# Patient Record
Sex: FEMALE | Race: WHITE | Employment: FULL TIME | URBAN - METROPOLITAN AREA
[De-identification: names, ages, dates, MRNs, and addresses within clinical notes are randomized per-mention and may not be internally consistent; named-entity substitution may affect disease eponyms.]

---

## 2019-04-30 LAB — PAP SMEAR, EXTERNAL: NEGATIVE

## 2020-08-07 VITALS
DIASTOLIC BLOOD PRESSURE: 79 MMHG | WEIGHT: 179.4 LBS | BODY MASS INDEX: 31.79 KG/M2 | HEIGHT: 63 IN | SYSTOLIC BLOOD PRESSURE: 119 MMHG

## 2020-08-12 ENCOUNTER — OFFICE VISIT (OUTPATIENT)
Dept: OBGYN CLINIC | Age: 40
End: 2020-08-12
Payer: COMMERCIAL

## 2020-08-12 VITALS
WEIGHT: 185.5 LBS | BODY MASS INDEX: 32.87 KG/M2 | DIASTOLIC BLOOD PRESSURE: 74 MMHG | SYSTOLIC BLOOD PRESSURE: 114 MMHG | HEIGHT: 63 IN

## 2020-08-12 DIAGNOSIS — N94.89 SUPPRESSION OF MENSTRUATION: ICD-10-CM

## 2020-08-12 DIAGNOSIS — Z12.4 SCREENING FOR MALIGNANT NEOPLASM OF CERVIX: Primary | ICD-10-CM

## 2020-08-12 DIAGNOSIS — Z01.419 GYNECOLOGIC EXAM NORMAL: ICD-10-CM

## 2020-08-12 PROCEDURE — 99396 PREV VISIT EST AGE 40-64: CPT | Performed by: OBSTETRICS & GYNECOLOGY

## 2020-08-12 RX ORDER — DEXTROAMPHETAMINE SACCHARATE, AMPHETAMINE ASPARTATE MONOHYDRATE, DEXTROAMPHETAMINE SULFATE AND AMPHETAMINE SULFATE 5; 5; 5; 5 MG/1; MG/1; MG/1; MG/1
CAPSULE, EXTENDED RELEASE ORAL
COMMUNITY
Start: 2020-08-04

## 2020-08-12 RX ORDER — NORGESTIMATE AND ETHINYL ESTRADIOL 0.25-0.035
1 KIT ORAL DAILY
Qty: 3 PACKAGE | Refills: 3 | Status: SHIPPED | OUTPATIENT
Start: 2020-08-12 | End: 2021-08-04 | Stop reason: ALTCHOICE

## 2020-08-12 RX ORDER — NORGESTIMATE AND ETHINYL ESTRADIOL 0.25-0.035
KIT ORAL
COMMUNITY
Start: 2020-08-01 | End: 2020-08-12 | Stop reason: SDUPTHER

## 2020-08-12 NOTE — PROGRESS NOTES
Emory Nunn is a , 38 y.o. female   Patient's last menstrual period was 2020 (exact date). She presents for her annual    She is having no significant problems. Menstrual status:  Her periods are: normal. Cycles are: usually regular with a 26-32 day interval with 3-7 day duration. She does not have dysmenorrhea. Medical conditions:  Since her last annual GYN exam about one year ago, she has not the following changes in her health history: none. History reviewed. No pertinent past medical history. Past Surgical History:   Procedure Laterality Date    HX ORTHOPAEDIC      Right Knee Surgery       Prior to Admission medications    Medication Sig Start Date End Date Taking? Authorizing Provider   amphetamine-dextroamphetamine XR (ADDERALL XR) 20 mg XR capsule TAKE 1 CAPSULE BY MOUTH IN THE MORNING 20  Yes Provider, Historical   Sprintec, 28, 0.25-35 mg-mcg tab Take 1 Tab by mouth daily. 20  Yes Laure Yeung MD       No Known Allergies       Tobacco History:  reports that she has never smoked. She has never used smokeless tobacco.  Alcohol Abuse:  reports current alcohol use. Drug Abuse:  has no history on file for drug. Family Medical/Cancer History:   Family History   Family history unknown: Yes          Review of Systems   Constitutional: Negative for chills, fever, malaise/fatigue and weight loss. HENT: Negative for congestion, ear pain, sinus pain and tinnitus. Eyes: Negative for blurred vision and double vision. Respiratory: Negative for cough, shortness of breath and wheezing. Cardiovascular: Negative for chest pain and palpitations. Gastrointestinal: Negative for abdominal pain, blood in stool, constipation, diarrhea, heartburn, nausea and vomiting. Genitourinary: Negative for dysuria, flank pain, frequency, hematuria and urgency. Musculoskeletal: Negative for joint pain and myalgias. Skin: Negative for itching and rash. Neurological: Negative for dizziness, weakness and headaches. Psychiatric/Behavioral: Negative for depression, memory loss and suicidal ideas. The patient is not nervous/anxious and does not have insomnia. Physical Exam  Constitutional:       Appearance: Normal appearance. HENT:      Head: Normocephalic and atraumatic. Cardiovascular:      Rate and Rhythm: Normal rate. Heart sounds: Normal heart sounds. Pulmonary:      Effort: Pulmonary effort is normal.      Breath sounds: Normal breath sounds. Chest:      Breasts:         Right: Normal.         Left: Normal.   Abdominal:      General: Abdomen is flat. Bowel sounds are normal.      Palpations: Abdomen is soft. Genitourinary:     General: Normal vulva. Vagina: Normal.      Cervix: Normal.      Uterus: Normal.       Adnexa: Right adnexa normal and left adnexa normal.      Rectum: Normal.      Comments: PAP Obtained  Neurological:      Mental Status: She is alert. Psychiatric:         Mood and Affect: Mood normal.         Behavior: Behavior normal.         Thought Content: Thought content normal.          Visit Vitals  /74 (BP 1 Location: Left arm, BP Patient Position: Sitting)   Ht 5' 3\" (1.6 m)   Wt 185 lb 8 oz (84.1 kg)   LMP 08/05/2020 (Exact Date)   BMI 32.86 kg/m²         Assessment:  Diagnoses and all orders for this visit:    1. Screening for malignant neoplasm of cervix  -     PAP, LB, RFX HPV HVRYF([id number])    2. Gynecologic exam normal    3. Suppression of menstruation    Other orders  -     Sprintec, 28, 0.25-35 mg-mcg tab; Take 1 Tab by mouth daily. Plan:Questions addressed  Counseled re: diet, exercise, healthy lifestyle  Return for Annual  Rec annual mammogram        Follow-up and Dispositions    · Return for Mammogram, 1 yr annual, 1 yr mammo.

## 2020-08-14 LAB
CYTOLOGIST CVX/VAG CYTO: NORMAL
CYTOLOGY CVX/VAG DOC CYTO: NORMAL
DX ICD CODE: NORMAL
LABCORP, 190119: NORMAL
Lab: NORMAL
OTHER STN SPEC: NORMAL
STAT OF ADQ CVX/VAG CYTO-IMP: NORMAL

## 2021-05-10 ENCOUNTER — TELEPHONE (OUTPATIENT)
Dept: OBGYN CLINIC | Age: 41
End: 2021-05-10

## 2021-05-10 RX ORDER — NORGESTIMATE AND ETHINYL ESTRADIOL 7DAYSX3 28
1 KIT ORAL DAILY
Qty: 3 PACKAGE | Refills: 0 | Status: SHIPPED | OUTPATIENT
Start: 2021-05-10 | End: 2021-08-04 | Stop reason: ALTCHOICE

## 2021-05-10 NOTE — PROGRESS NOTES
RTC as requested, pt having BTB in the 3rd week of current oc, has been occurring for approx the last year- DWP, will change OC to see if that corrects things

## 2021-05-28 ENCOUNTER — TELEPHONE (OUTPATIENT)
Dept: OBGYN CLINIC | Age: 41
End: 2021-05-28

## 2021-05-28 NOTE — TELEPHONE ENCOUNTER
Returned the patient's call and she states she has not received a call from the scheduling department regarding her need for additional imaging. Patient scheduled at [facility] on 06/04/21 at 9:30 with a 9:00 arrival time. Orders faxed.

## 2021-08-04 ENCOUNTER — TELEPHONE (OUTPATIENT)
Dept: OBGYN CLINIC | Age: 41
End: 2021-08-04

## 2021-08-04 DIAGNOSIS — N94.89 SUPPRESSION OF MENSES: Primary | ICD-10-CM

## 2021-08-04 RX ORDER — NORGESTIMATE AND ETHINYL ESTRADIOL 7DAYSX3 28
1 KIT ORAL DAILY
Qty: 3 PACKAGE | Refills: 0 | Status: SHIPPED | OUTPATIENT
Start: 2021-08-04 | End: 2021-09-22 | Stop reason: SDUPTHER

## 2021-08-04 NOTE — TELEPHONE ENCOUNTER
Returned the patient's call and she is requesting a refill on medication. She is scheduled for her annual exam on 08/31/21. Refill sent to the patient's pharmacy.

## 2021-09-22 ENCOUNTER — OFFICE VISIT (OUTPATIENT)
Dept: OBGYN CLINIC | Age: 41
End: 2021-09-22
Payer: COMMERCIAL

## 2021-09-22 VITALS
BODY MASS INDEX: 32.6 KG/M2 | SYSTOLIC BLOOD PRESSURE: 110 MMHG | DIASTOLIC BLOOD PRESSURE: 70 MMHG | HEIGHT: 63 IN | WEIGHT: 184 LBS

## 2021-09-22 DIAGNOSIS — F50.89 PICA: ICD-10-CM

## 2021-09-22 DIAGNOSIS — N94.89 SUPPRESSION OF MENSES: ICD-10-CM

## 2021-09-22 DIAGNOSIS — Z12.4 SCREENING FOR MALIGNANT NEOPLASM OF CERVIX: Primary | ICD-10-CM

## 2021-09-22 DIAGNOSIS — N94.89 SUPPRESSION OF MENSTRUATION: ICD-10-CM

## 2021-09-22 DIAGNOSIS — D50.9 IRON DEFICIENCY ANEMIA, UNSPECIFIED IRON DEFICIENCY ANEMIA TYPE: ICD-10-CM

## 2021-09-22 DIAGNOSIS — Z01.419 ROUTINE GYNECOLOGICAL EXAMINATION: ICD-10-CM

## 2021-09-22 PROCEDURE — 99396 PREV VISIT EST AGE 40-64: CPT | Performed by: OBSTETRICS & GYNECOLOGY

## 2021-09-22 RX ORDER — LEVONORGESTREL AND ETHINYL ESTRADIOL 0.15-0.03
1 KIT ORAL DAILY
Qty: 1 DOSE PACK | Refills: 3 | Status: SHIPPED | OUTPATIENT
Start: 2021-09-22 | End: 2022-04-22

## 2021-09-22 RX ORDER — NORGESTIMATE AND ETHINYL ESTRADIOL 7DAYSX3 28
1 KIT ORAL DAILY
Qty: 3 DOSE PACK | Refills: 3 | Status: SHIPPED | OUTPATIENT
Start: 2021-09-22

## 2021-09-22 NOTE — PROGRESS NOTES
Chief Complaint   Patient presents with    Annual Exam     [id number]     Visit Vitals  /70 (BP 1 Location: Left upper arm, BP Patient Position: Sitting, BP Cuff Size: Large adult)   Ht 5' 3\" (1.6 m)   Wt 184 lb (83.5 kg)   LMP 09/01/2021 (Approximate)   BMI 32.59 kg/m²

## 2021-09-22 NOTE — PROGRESS NOTES
Chito Rascon is a , 42 y.o. female   Patient's last menstrual period was 2021 (approximate). She presents for her annual    She is having no significant problems. PICA sxs and anemia- having irregular bleeding on OC's- now occurs in the 3rd week      Menstrual status:  Cycles are very light to nonexistent due to contraceptive hormones. Flow: moderate      She does not have dysmenorrhea. Medical conditions:  Since her last annual GYN exam about one year ago, she has not the following changes in her health history: none. Mammogram History:    [facility] Results (most recent):  Results from Appointment encounter on 05/10/21    [facility] MAMMO BI SCREENING INCL CAD    Narrative  History: Baseline, asymptomatic. The breasts are heterogeneously dense which could obscure mammographic findings. In the CC view of the left breast, there are 2 focal asymmetries, one located  slightly lateral to the nipple line, the other medially, each in the mid to  posterior coronal third of the breast. No other suspicious finding or pathologic  calcification is seen in either breast.    CAD was used in interpretation. Impression  Spot compression views and possibly ultrasound are needed to  evaluate focal asymmetries in left breast.    BI-RADS 0 need additional imaging evaluation. Follow-up: Additional views and possibly ultrasound. DEXA Results (most recent):  No results found for this or any previous visit. Past Medical History:   Diagnosis Date    Anemia     Heavy periods      Past Surgical History:   Procedure Laterality Date    HX ORTHOPAEDIC      Right Knee Surgery       Prior to Admission medications    Medication Sig Start Date End Date Taking? Authorizing Provider   norgestimate-ethinyl estradioL (Tri-Sprintec, 28,) 0.18/0.215/0.25 mg-35 mcg (28) tab Take 1 Tablet by mouth daily.  21  Yes Juan Kc MD   amphetamine-dextroamphetamine XR (ADDERALL XR) 20 mg XR capsule TAKE 1 CAPSULE BY MOUTH IN THE MORNING 8/4/20  Yes Provider, Historical       No Known Allergies       Tobacco History:  reports that she has never smoked. She has never used smokeless tobacco.  Alcohol Abuse:  reports current alcohol use. Drug Abuse:  reports previous drug use. Family Medical/Cancer History:   Family History   Family history unknown: Yes          Review of Systems   Constitutional: Negative for chills, fever, malaise/fatigue and weight loss. HENT: Negative for congestion, ear pain, sinus pain and tinnitus. Eyes: Negative for blurred vision and double vision. Respiratory: Negative for cough, shortness of breath and wheezing. Cardiovascular: Negative for chest pain and palpitations. Gastrointestinal: Negative for abdominal pain, blood in stool, constipation, diarrhea, heartburn, nausea and vomiting. Genitourinary: Negative for dysuria, flank pain, frequency, hematuria and urgency. Musculoskeletal: Negative for joint pain and myalgias. Skin: Negative for itching and rash. Neurological: Negative for dizziness, weakness and headaches. Psychiatric/Behavioral: Negative for depression, memory loss and suicidal ideas. The patient is not nervous/anxious and does not have insomnia. Physical Exam  Constitutional:       Appearance: Normal appearance. HENT:      Head: Normocephalic and atraumatic. Cardiovascular:      Rate and Rhythm: Normal rate. Heart sounds: Normal heart sounds. Pulmonary:      Effort: Pulmonary effort is normal.      Breath sounds: Normal breath sounds. Chest:      Breasts:         Right: Normal.         Left: Normal.   Abdominal:      General: Abdomen is flat. Palpations: Abdomen is soft. Genitourinary:     General: Normal vulva.       Vagina: Normal.      Cervix: Normal.      Uterus: Normal.       Adnexa: Right adnexa normal and left adnexa normal.      Rectum: Normal.      Comments: PAP Obtained  Neurological:      Mental Status: She is alert.   Psychiatric:         Mood and Affect: Mood normal.         Behavior: Behavior normal.         Thought Content: Thought content normal.          Visit Vitals  /70 (BP 1 Location: Left upper arm, BP Patient Position: Sitting, BP Cuff Size: Large adult)   Ht 5' 3\" (1.6 m)   Wt 184 lb (83.5 kg)   LMP 09/01/2021 (Approximate)   BMI 32.59 kg/m²         Assessment: Diagnoses and all orders for this visit:    1. Screening for malignant neoplasm of cervix  -     PAP IG, RFX APTIMA HPV ASCUS ([id number])    2. Routine gynecological examination  -     PAP IG, RFX APTIMA HPV ASCUS ([id number])    3. Suppression of menstruation    4. BMI 32.0-32.9,adult    5. Suppression of menses  -     norgestimate-ethinyl estradioL (Tri-Sprintec, 28,) 0.18/0.215/0.25 mg-35 mcg (28) tab; Take 1 Tablet by mouth daily. 6. Iron deficiency anemia, unspecified iron deficiency anemia type    7.  Pica        Plan: Questions addressed  Counseled re: diet, exercise, healthy lifestyle  Return for Annual  Rec annual mammogram

## 2021-09-24 LAB
CYTOLOGIST CVX/VAG CYTO: NORMAL
CYTOLOGY CVX/VAG DOC CYTO: NORMAL
CYTOLOGY CVX/VAG DOC THIN PREP: NORMAL
DX ICD CODE: NORMAL
LABCORP, 190119: NORMAL
Lab: NORMAL
OTHER STN SPEC: NORMAL
STAT OF ADQ CVX/VAG CYTO-IMP: NORMAL

## 2022-01-19 ENCOUNTER — TELEPHONE (OUTPATIENT)
Dept: OBGYN CLINIC | Age: 42
End: 2022-01-19

## 2022-01-19 NOTE — TELEPHONE ENCOUNTER
Returned the patient's call regarding her c/o spotting daily for the last 3 months since being on OCPs. States she is now experiencing heavy vaginal bleeding. She is wearing a super plus tampon and having to change it every hour. Patient didn't answer and her mailbox is full. She would like to speak with Dr Jigar Francisco. Message forwarded.

## 2022-04-04 ENCOUNTER — TELEPHONE (OUTPATIENT)
Dept: OBGYN CLINIC | Age: 42
End: 2022-04-04

## 2022-04-04 NOTE — TELEPHONE ENCOUNTER
Patient called stating she is still experiencing DUB and would like to discuss having an ablation. States she is taking the extended cycle pills and did ok with the first pack and 2 weeks of the next pack but had been bleeding since then. Advised her Dr Kelly Huang is not in the office today but her message would be forwarded to him.

## 2022-04-22 ENCOUNTER — OFFICE VISIT (OUTPATIENT)
Dept: OBGYN CLINIC | Age: 42
End: 2022-04-22
Payer: COMMERCIAL

## 2022-04-22 VITALS — WEIGHT: 191 LBS | BODY MASS INDEX: 33.84 KG/M2 | HEIGHT: 63 IN

## 2022-04-22 DIAGNOSIS — N92.0 MENORRHAGIA WITH REGULAR CYCLE: Primary | ICD-10-CM

## 2022-04-22 DIAGNOSIS — D50.0 IRON DEFICIENCY ANEMIA DUE TO CHRONIC BLOOD LOSS: ICD-10-CM

## 2022-04-22 PROCEDURE — 99213 OFFICE O/P EST LOW 20 MIN: CPT | Performed by: OBSTETRICS & GYNECOLOGY

## 2022-04-22 NOTE — PROGRESS NOTES
Chai Vazquez is a , 42 y.o. female   No LMP recorded. She presents for her problem    She is having menorrhagia to anemia- failed medcial therapy with OC. Menstrual status:  Cycles are usually regular with a 26-32 day interval with 3-7 day duration. Flow: heavy.with clots      She does not have dysmenorrhea. Medical conditions:  Since her last annual GYN exam about one year ago, she has not the following changes in her health history: none. Mammogram History:    [facility] Results (most recent):  Results from Appointment encounter on 05/10/21    [facility] MAMMO BI SCREENING INCL CAD    Narrative  History: Baseline, asymptomatic. The breasts are heterogeneously dense which could obscure mammographic findings. In the CC view of the left breast, there are 2 focal asymmetries, one located  slightly lateral to the nipple line, the other medially, each in the mid to  posterior coronal third of the breast. No other suspicious finding or pathologic  calcification is seen in either breast.    CAD was used in interpretation. Impression  Spot compression views and possibly ultrasound are needed to  evaluate focal asymmetries in left breast.    BI-RADS 0 need additional imaging evaluation. Follow-up: Additional views and possibly ultrasound. DEXA Results (most recent):  No results found for this or any previous visit. Past Medical History:   Diagnosis Date    Anemia     Heavy periods      Past Surgical History:   Procedure Laterality Date    HX ORTHOPAEDIC      Right Knee Surgery       Prior to Admission medications    Medication Sig Start Date End Date Taking? Authorizing Provider   norgestimate-ethinyl estradioL (Tri-Sprintec, 28,) 0.18/0.215/0.25 mg-35 mcg (28) tab Take 1 Tablet by mouth daily.  21  Yes Annalisa Bradford MD   amphetamine-dextroamphetamine XR (ADDERALL XR) 20 mg XR capsule TAKE 1 CAPSULE BY MOUTH IN THE MORNING 20  Yes Provider, Historical       No Known Allergies       Tobacco History:  reports that she has never smoked. She has never used smokeless tobacco.  Alcohol use:  reports current alcohol use. Drug use:  reports previous drug use. Family Medical/Cancer History:   Family History   Family history unknown: Yes          Review of Systems   Constitutional: Negative for chills, fever, malaise/fatigue and weight loss. HENT: Negative for congestion, ear pain, sinus pain and tinnitus. Eyes: Negative for blurred vision and double vision. Respiratory: Negative for cough, shortness of breath and wheezing. Cardiovascular: Negative for chest pain and palpitations. Gastrointestinal: Negative for abdominal pain, blood in stool, constipation, diarrhea, heartburn, nausea and vomiting. Genitourinary: Negative for dysuria, flank pain, frequency, hematuria and urgency. Musculoskeletal: Negative for joint pain and myalgias. Skin: Negative for itching and rash. Neurological: Negative for dizziness, weakness and headaches. Psychiatric/Behavioral: Negative for depression, memory loss and suicidal ideas. The patient is not nervous/anxious and does not have insomnia. Physical Exam  Constitutional:       Appearance: Normal appearance. HENT:      Head: Normocephalic and atraumatic. Abdominal:      General: Abdomen is flat. Palpations: Abdomen is soft. Genitourinary:     General: Normal vulva. Vagina: Normal.      Cervix: Normal.      Uterus: Normal. Enlarged. Adnexa: Right adnexa normal and left adnexa normal.      Rectum: Normal.      Comments: No PAP obtained  Neurological:      Mental Status: She is alert. Psychiatric:         Mood and Affect: Mood normal.         Behavior: Behavior normal.         Thought Content: Thought content normal.          Visit Vitals  Ht 5' 3\" (1.6 m)   Wt 191 lb (86.6 kg)   BMI 33.83 kg/m²         Assessment: Diagnoses and all orders for this visit:    1. Menorrhagia with regular cycle    2.  Iron deficiency anemia due to chronic blood loss    Risks benefits and alternatives DWP- Hysteroscopy/D and C/ Novasure    Plan: Questions addressed  Counseled re: diet, exercise, healthy lifestyle  Return for Annual  Rec annual mammogram

## 2022-04-22 NOTE — PROGRESS NOTES
Chief Complaint   Patient presents with    Pre-op Exam     Visit Vitals  Ht 5' 3\" (1.6 m)   Wt 191 lb (86.6 kg)   BMI 33.83 kg/m²

## 2022-05-05 ENCOUNTER — HOSPITAL ENCOUNTER (OUTPATIENT)
Dept: PREADMISSION TESTING | Age: 42
Discharge: HOME | End: 2022-05-05
Payer: COMMERCIAL

## 2022-05-05 VITALS
HEART RATE: 89 BPM | OXYGEN SATURATION: 100 % | DIASTOLIC BLOOD PRESSURE: 79 MMHG | SYSTOLIC BLOOD PRESSURE: 134 MMHG | BODY MASS INDEX: 34.09 KG/M2 | TEMPERATURE: 98.3 F | RESPIRATION RATE: 20 BRPM | WEIGHT: 192.4 LBS | HEIGHT: 63 IN

## 2022-05-05 LAB
ABO + RH BLD: NORMAL
ANION GAP SERPL CALC-SCNC: 6 MMOL/L (ref 5–15)
BLOOD GROUP ANTIBODIES SERPL: NEGATIVE
BUN SERPL-MCNC: 9 MG/DL (ref 6–20)
BUN/CREAT SERPL: 10 (ref 12–20)
CA-I BLD-MCNC: 9.2 MG/DL (ref 8.5–10.1)
CHLORIDE SERPL-SCNC: 107 MMOL/L (ref 97–108)
CO2 SERPL-SCNC: 25 MMOL/L (ref 21–32)
CREAT SERPL-MCNC: 0.92 MG/DL (ref 0.55–1.02)
ERYTHROCYTE [DISTWIDTH] IN BLOOD BY AUTOMATED COUNT: 17.1 % (ref 11.5–14.5)
GLUCOSE SERPL-MCNC: 91 MG/DL (ref 65–100)
HCT VFR BLD AUTO: 35.4 % (ref 35–47)
HGB BLD-MCNC: 10.5 G/DL (ref 11.5–16)
MCH RBC QN AUTO: 21 PG (ref 26–34)
MCHC RBC AUTO-ENTMCNC: 29.7 G/DL (ref 30–36.5)
MCV RBC AUTO: 70.9 FL (ref 80–99)
NRBC # BLD: 0 K/UL (ref 0–0.01)
NRBC BLD-RTO: 0 PER 100 WBC
PLATELET # BLD AUTO: 351 K/UL (ref 150–400)
PMV BLD AUTO: 10.5 FL (ref 8.9–12.9)
POTASSIUM SERPL-SCNC: 3.7 MMOL/L (ref 3.5–5.1)
RBC # BLD AUTO: 4.99 M/UL (ref 3.8–5.2)
SODIUM SERPL-SCNC: 138 MMOL/L (ref 136–145)
SPECIMEN EXP DATE BLD: NORMAL
WBC # BLD AUTO: 7.7 K/UL (ref 3.6–11)

## 2022-05-05 PROCEDURE — 36415 COLL VENOUS BLD VENIPUNCTURE: CPT

## 2022-05-05 PROCEDURE — 80048 BASIC METABOLIC PNL TOTAL CA: CPT

## 2022-05-05 PROCEDURE — 86900 BLOOD TYPING SEROLOGIC ABO: CPT

## 2022-05-05 PROCEDURE — 85027 COMPLETE CBC AUTOMATED: CPT

## 2022-05-12 ENCOUNTER — HOSPITAL ENCOUNTER (OUTPATIENT)
Age: 42
Discharge: HOME | End: 2022-05-12
Attending: OBSTETRICS & GYNECOLOGY | Admitting: OBSTETRICS & GYNECOLOGY
Payer: COMMERCIAL

## 2022-05-12 ENCOUNTER — ANESTHESIA EVENT (OUTPATIENT)
Dept: SURGERY | Age: 42
End: 2022-05-12
Payer: COMMERCIAL

## 2022-05-12 ENCOUNTER — ANESTHESIA (OUTPATIENT)
Dept: SURGERY | Age: 42
End: 2022-05-12
Payer: COMMERCIAL

## 2022-05-12 VITALS
DIASTOLIC BLOOD PRESSURE: 85 MMHG | SYSTOLIC BLOOD PRESSURE: 131 MMHG | RESPIRATION RATE: 16 BRPM | OXYGEN SATURATION: 98 % | TEMPERATURE: 97.9 F | HEART RATE: 71 BPM

## 2022-05-12 DIAGNOSIS — G89.18 POST-OPERATIVE PAIN: Primary | ICD-10-CM

## 2022-05-12 PROBLEM — N92.0 MENORRHAGIA: Status: ACTIVE | Noted: 2022-05-12

## 2022-05-12 PROBLEM — D64.9 ANEMIA: Status: ACTIVE | Noted: 2022-05-12

## 2022-05-12 PROBLEM — D21.9 FIBROIDS: Status: ACTIVE | Noted: 2022-05-12

## 2022-05-12 LAB — HCG UR QL: NEGATIVE

## 2022-05-12 PROCEDURE — 74011250636 HC RX REV CODE- 250/636: Performed by: NURSE ANESTHETIST, CERTIFIED REGISTERED

## 2022-05-12 PROCEDURE — 76210000063 HC OR PH I REC FIRST 0.5 HR: Performed by: OBSTETRICS & GYNECOLOGY

## 2022-05-12 PROCEDURE — 77030019905 HC CATH URETH INTMIT MDII -A: Performed by: OBSTETRICS & GYNECOLOGY

## 2022-05-12 PROCEDURE — 74011000250 HC RX REV CODE- 250: Performed by: NURSE ANESTHETIST, CERTIFIED REGISTERED

## 2022-05-12 PROCEDURE — 77030034928 HC DEV UTER SURSND KT HOLO -G1: Performed by: OBSTETRICS & GYNECOLOGY

## 2022-05-12 PROCEDURE — 76010000149 HC OR TIME 1 TO 1.5 HR: Performed by: OBSTETRICS & GYNECOLOGY

## 2022-05-12 PROCEDURE — 2709999900 HC NON-CHARGEABLE SUPPLY: Performed by: OBSTETRICS & GYNECOLOGY

## 2022-05-12 PROCEDURE — 81025 URINE PREGNANCY TEST: CPT

## 2022-05-12 PROCEDURE — 74011250636 HC RX REV CODE- 250/636: Performed by: OBSTETRICS & GYNECOLOGY

## 2022-05-12 PROCEDURE — 74011250637 HC RX REV CODE- 250/637: Performed by: NURSE ANESTHETIST, CERTIFIED REGISTERED

## 2022-05-12 PROCEDURE — 77030020268 HC MISC GENERAL SUPPLY: Performed by: OBSTETRICS & GYNECOLOGY

## 2022-05-12 PROCEDURE — 88305 TISSUE EXAM BY PATHOLOGIST: CPT

## 2022-05-12 PROCEDURE — 76210000021 HC REC RM PH II 0.5 TO 1 HR: Performed by: OBSTETRICS & GYNECOLOGY

## 2022-05-12 PROCEDURE — 76060000033 HC ANESTHESIA 1 TO 1.5 HR: Performed by: OBSTETRICS & GYNECOLOGY

## 2022-05-12 RX ORDER — KETOROLAC TROMETHAMINE 30 MG/ML
15 INJECTION, SOLUTION INTRAMUSCULAR; INTRAVENOUS
Status: DISCONTINUED | OUTPATIENT
Start: 2022-05-12 | End: 2022-05-12 | Stop reason: HOSPADM

## 2022-05-12 RX ORDER — ONDANSETRON 2 MG/ML
4 INJECTION INTRAMUSCULAR; INTRAVENOUS AS NEEDED
Status: DISCONTINUED | OUTPATIENT
Start: 2022-05-12 | End: 2022-05-12 | Stop reason: HOSPADM

## 2022-05-12 RX ORDER — SODIUM CHLORIDE 0.9 % (FLUSH) 0.9 %
5-40 SYRINGE (ML) INJECTION EVERY 8 HOURS
Status: DISCONTINUED | OUTPATIENT
Start: 2022-05-12 | End: 2022-05-12 | Stop reason: HOSPADM

## 2022-05-12 RX ORDER — MIDAZOLAM HYDROCHLORIDE 1 MG/ML
1 INJECTION, SOLUTION INTRAMUSCULAR; INTRAVENOUS AS NEEDED
Status: DISCONTINUED | OUTPATIENT
Start: 2022-05-12 | End: 2022-05-12 | Stop reason: HOSPADM

## 2022-05-12 RX ORDER — DEXAMETHASONE SODIUM PHOSPHATE 4 MG/ML
INJECTION, SOLUTION INTRA-ARTICULAR; INTRALESIONAL; INTRAMUSCULAR; INTRAVENOUS; SOFT TISSUE AS NEEDED
Status: DISCONTINUED | OUTPATIENT
Start: 2022-05-12 | End: 2022-05-12 | Stop reason: HOSPADM

## 2022-05-12 RX ORDER — ONDANSETRON 2 MG/ML
4 INJECTION INTRAMUSCULAR; INTRAVENOUS
Status: DISCONTINUED | OUTPATIENT
Start: 2022-05-12 | End: 2022-05-12 | Stop reason: HOSPADM

## 2022-05-12 RX ORDER — KETOROLAC TROMETHAMINE 30 MG/ML
INJECTION, SOLUTION INTRAMUSCULAR; INTRAVENOUS AS NEEDED
Status: DISCONTINUED | OUTPATIENT
Start: 2022-05-12 | End: 2022-05-12 | Stop reason: HOSPADM

## 2022-05-12 RX ORDER — HYDROMORPHONE HYDROCHLORIDE 1 MG/ML
0.4 INJECTION, SOLUTION INTRAMUSCULAR; INTRAVENOUS; SUBCUTANEOUS
Status: DISCONTINUED | OUTPATIENT
Start: 2022-05-12 | End: 2022-05-12 | Stop reason: HOSPADM

## 2022-05-12 RX ORDER — OXYCODONE AND ACETAMINOPHEN 5; 325 MG/1; MG/1
1 TABLET ORAL
Qty: 12 TABLET | Refills: 0 | Status: SHIPPED | OUTPATIENT
Start: 2022-05-12 | End: 2022-05-15

## 2022-05-12 RX ORDER — MIDAZOLAM HYDROCHLORIDE 1 MG/ML
0.5 INJECTION, SOLUTION INTRAMUSCULAR; INTRAVENOUS
Status: DISCONTINUED | OUTPATIENT
Start: 2022-05-12 | End: 2022-05-12 | Stop reason: HOSPADM

## 2022-05-12 RX ORDER — HYDRALAZINE HYDROCHLORIDE 20 MG/ML
10 INJECTION INTRAMUSCULAR; INTRAVENOUS
Status: DISCONTINUED | OUTPATIENT
Start: 2022-05-12 | End: 2022-05-12 | Stop reason: HOSPADM

## 2022-05-12 RX ORDER — FENTANYL CITRATE 50 UG/ML
50 INJECTION, SOLUTION INTRAMUSCULAR; INTRAVENOUS
Status: DISCONTINUED | OUTPATIENT
Start: 2022-05-12 | End: 2022-05-12 | Stop reason: HOSPADM

## 2022-05-12 RX ORDER — SCOLOPAMINE TRANSDERMAL SYSTEM 1 MG/1
PATCH, EXTENDED RELEASE TRANSDERMAL AS NEEDED
Status: DISCONTINUED | OUTPATIENT
Start: 2022-05-12 | End: 2022-05-12 | Stop reason: HOSPADM

## 2022-05-12 RX ORDER — SODIUM CHLORIDE, SODIUM LACTATE, POTASSIUM CHLORIDE, CALCIUM CHLORIDE 600; 310; 30; 20 MG/100ML; MG/100ML; MG/100ML; MG/100ML
20 INJECTION, SOLUTION INTRAVENOUS CONTINUOUS
Status: DISCONTINUED | OUTPATIENT
Start: 2022-05-12 | End: 2022-05-12 | Stop reason: HOSPADM

## 2022-05-12 RX ORDER — OXYCODONE HYDROCHLORIDE 5 MG/1
5-10 TABLET ORAL
Status: DISCONTINUED | OUTPATIENT
Start: 2022-05-12 | End: 2022-05-12 | Stop reason: HOSPADM

## 2022-05-12 RX ORDER — ONDANSETRON 2 MG/ML
INJECTION INTRAMUSCULAR; INTRAVENOUS AS NEEDED
Status: DISCONTINUED | OUTPATIENT
Start: 2022-05-12 | End: 2022-05-12 | Stop reason: HOSPADM

## 2022-05-12 RX ORDER — SODIUM CHLORIDE 0.9 % (FLUSH) 0.9 %
5-40 SYRINGE (ML) INJECTION AS NEEDED
Status: DISCONTINUED | OUTPATIENT
Start: 2022-05-12 | End: 2022-05-12 | Stop reason: HOSPADM

## 2022-05-12 RX ORDER — SODIUM CHLORIDE, SODIUM LACTATE, POTASSIUM CHLORIDE, CALCIUM CHLORIDE 600; 310; 30; 20 MG/100ML; MG/100ML; MG/100ML; MG/100ML
100 INJECTION, SOLUTION INTRAVENOUS CONTINUOUS
Status: DISCONTINUED | OUTPATIENT
Start: 2022-05-12 | End: 2022-05-12 | Stop reason: HOSPADM

## 2022-05-12 RX ORDER — OXYCODONE AND ACETAMINOPHEN 5; 325 MG/1; MG/1
1 TABLET ORAL AS NEEDED
Status: DISCONTINUED | OUTPATIENT
Start: 2022-05-12 | End: 2022-05-12 | Stop reason: HOSPADM

## 2022-05-12 RX ORDER — LABETALOL HCL 20 MG/4 ML
5 SYRINGE (ML) INTRAVENOUS
Status: DISCONTINUED | OUTPATIENT
Start: 2022-05-12 | End: 2022-05-12 | Stop reason: HOSPADM

## 2022-05-12 RX ORDER — DOCUSATE SODIUM 100 MG/1
100 CAPSULE, LIQUID FILLED ORAL 2 TIMES DAILY
Status: DISCONTINUED | OUTPATIENT
Start: 2022-05-12 | End: 2022-05-12 | Stop reason: HOSPADM

## 2022-05-12 RX ORDER — LIDOCAINE HYDROCHLORIDE 10 MG/ML
0.1 INJECTION, SOLUTION EPIDURAL; INFILTRATION; INTRACAUDAL; PERINEURAL AS NEEDED
Status: DISCONTINUED | OUTPATIENT
Start: 2022-05-12 | End: 2022-05-12 | Stop reason: HOSPADM

## 2022-05-12 RX ORDER — MORPHINE SULFATE 2 MG/ML
2 INJECTION, SOLUTION INTRAMUSCULAR; INTRAVENOUS
Status: DISCONTINUED | OUTPATIENT
Start: 2022-05-12 | End: 2022-05-12 | Stop reason: HOSPADM

## 2022-05-12 RX ORDER — NORETHINDRONE AND ETHINYL ESTRADIOL 0.5-0.035
5 KIT ORAL AS NEEDED
Status: DISCONTINUED | OUTPATIENT
Start: 2022-05-12 | End: 2022-05-12 | Stop reason: HOSPADM

## 2022-05-12 RX ORDER — DIPHENHYDRAMINE HYDROCHLORIDE 50 MG/ML
12.5 INJECTION, SOLUTION INTRAMUSCULAR; INTRAVENOUS
Status: DISCONTINUED | OUTPATIENT
Start: 2022-05-12 | End: 2022-05-12 | Stop reason: HOSPADM

## 2022-05-12 RX ORDER — FENTANYL CITRATE 50 UG/ML
50 INJECTION, SOLUTION INTRAMUSCULAR; INTRAVENOUS AS NEEDED
Status: DISCONTINUED | OUTPATIENT
Start: 2022-05-12 | End: 2022-05-12 | Stop reason: HOSPADM

## 2022-05-12 RX ORDER — MIDAZOLAM HYDROCHLORIDE 1 MG/ML
INJECTION, SOLUTION INTRAMUSCULAR; INTRAVENOUS AS NEEDED
Status: DISCONTINUED | OUTPATIENT
Start: 2022-05-12 | End: 2022-05-12 | Stop reason: HOSPADM

## 2022-05-12 RX ORDER — HYDROMORPHONE HYDROCHLORIDE 1 MG/ML
1 INJECTION, SOLUTION INTRAMUSCULAR; INTRAVENOUS; SUBCUTANEOUS
Status: DISCONTINUED | OUTPATIENT
Start: 2022-05-12 | End: 2022-05-12 | Stop reason: HOSPADM

## 2022-05-12 RX ORDER — LIDOCAINE HYDROCHLORIDE 20 MG/ML
INJECTION, SOLUTION EPIDURAL; INFILTRATION; INTRACAUDAL; PERINEURAL AS NEEDED
Status: DISCONTINUED | OUTPATIENT
Start: 2022-05-12 | End: 2022-05-12 | Stop reason: HOSPADM

## 2022-05-12 RX ORDER — SODIUM CHLORIDE, SODIUM LACTATE, POTASSIUM CHLORIDE, CALCIUM CHLORIDE 600; 310; 30; 20 MG/100ML; MG/100ML; MG/100ML; MG/100ML
INJECTION, SOLUTION INTRAVENOUS
Status: DISCONTINUED | OUTPATIENT
Start: 2022-05-12 | End: 2022-05-12 | Stop reason: HOSPADM

## 2022-05-12 RX ORDER — METOPROLOL TARTRATE 5 MG/5ML
2.5 INJECTION INTRAVENOUS
Status: DISCONTINUED | OUTPATIENT
Start: 2022-05-12 | End: 2022-05-12 | Stop reason: HOSPADM

## 2022-05-12 RX ORDER — SCOLOPAMINE TRANSDERMAL SYSTEM 1 MG/1
PATCH, EXTENDED RELEASE TRANSDERMAL
Status: COMPLETED
Start: 2022-05-12 | End: 2022-05-12

## 2022-05-12 RX ORDER — PROPOFOL 10 MG/ML
INJECTION, EMULSION INTRAVENOUS AS NEEDED
Status: DISCONTINUED | OUTPATIENT
Start: 2022-05-12 | End: 2022-05-12 | Stop reason: HOSPADM

## 2022-05-12 RX ORDER — FENTANYL CITRATE 50 UG/ML
INJECTION, SOLUTION INTRAMUSCULAR; INTRAVENOUS AS NEEDED
Status: DISCONTINUED | OUTPATIENT
Start: 2022-05-12 | End: 2022-05-12 | Stop reason: HOSPADM

## 2022-05-12 RX ORDER — DIPHENHYDRAMINE HYDROCHLORIDE 50 MG/ML
12.5 INJECTION, SOLUTION INTRAMUSCULAR; INTRAVENOUS AS NEEDED
Status: DISCONTINUED | OUTPATIENT
Start: 2022-05-12 | End: 2022-05-12 | Stop reason: HOSPADM

## 2022-05-12 RX ADMIN — SODIUM CHLORIDE, POTASSIUM CHLORIDE, SODIUM LACTATE AND CALCIUM CHLORIDE: 600; 310; 30; 20 INJECTION, SOLUTION INTRAVENOUS at 07:00

## 2022-05-12 RX ADMIN — KETOROLAC TROMETHAMINE 30 MG: 30 INJECTION, SOLUTION INTRAMUSCULAR at 08:38

## 2022-05-12 RX ADMIN — MIDAZOLAM HYDROCHLORIDE 2 MG: 2 INJECTION, SOLUTION INTRAMUSCULAR; INTRAVENOUS at 07:43

## 2022-05-12 RX ADMIN — FENTANYL CITRATE 100 MCG: 50 INJECTION, SOLUTION INTRAMUSCULAR; INTRAVENOUS at 07:47

## 2022-05-12 RX ADMIN — ONDANSETRON 4 MG: 2 INJECTION INTRAMUSCULAR; INTRAVENOUS at 07:53

## 2022-05-12 RX ADMIN — LIDOCAINE HYDROCHLORIDE 40 MG: 20 INJECTION, SOLUTION EPIDURAL; INFILTRATION; INTRACAUDAL; PERINEURAL at 07:49

## 2022-05-12 RX ADMIN — SCOPALAMINE 1 PATCH: 1 PATCH, EXTENDED RELEASE TRANSDERMAL at 07:26

## 2022-05-12 RX ADMIN — SODIUM CHLORIDE, POTASSIUM CHLORIDE, SODIUM LACTATE AND CALCIUM CHLORIDE 20 ML/HR: 600; 310; 30; 20 INJECTION, SOLUTION INTRAVENOUS at 06:34

## 2022-05-12 RX ADMIN — DEXAMETHASONE SODIUM PHOSPHATE 4 MG: 4 INJECTION, SOLUTION INTRA-ARTICULAR; INTRALESIONAL; INTRAMUSCULAR; INTRAVENOUS; SOFT TISSUE at 07:53

## 2022-05-12 RX ADMIN — PROPOFOL 200 MG: 10 INJECTION, EMULSION INTRAVENOUS at 07:49

## 2022-05-12 NOTE — BRIEF OP NOTE
Brief Postoperative Note    Patient: Hamlet Feliciano  YOB: 1980  MRN: [id number]    Date of Procedure: 5/12/2022     Pre-Op Diagnosis: Menorrhagia with regular cycle [N92.0]  Anemia, unspecified type [D64.9]    Post-Op Diagnosis: same, Fibroids    Procedure(s): HYSTEROSCOPY    Surgeon(s):  Annalisa Bradford MD    Surgical Assistant: Surg Asst-1: [facility]    Anesthesia: General     Estimated Blood Loss (mL):Minimal    Complications: None    Specimens:   ID Type Source Tests Collected by Time Destination   1 : Endometrial Tissue Preservative Uterus  Annalisa Bradford MD 5/12/2022 0840 Pathology        Implants: none  Drains: St Cath- Clear    Findings: Multiple submucosal flat broad fibroids, no D and C Done, Cavity length 5.5, Width 4.4, With Novasure device- unable to pass cavity assessment due fibroids- attempted twice, then cavity reassessed no perforations noted, cavity assessment attempted for a third time and failed.       Electronically Signed by Cristal Valiente MD on 5/12/2022 at 8:52 AM

## 2022-05-12 NOTE — DISCHARGE INSTRUCTIONS
Patient Education   Patient Education   Patient Education        Endometrial Ablation: What to Expect at Home  Your Recovery  Endometrial ablation is a procedure to treat very heavy menstrual bleeding or other abnormal bleeding in the uterus. During ablation, your doctor used a device to destroy the lining of your uterus. The lining heals by scarring. The scarring reduces or prevents bleeding. You may have cramps and vaginal bleeding or spotting for several days. You may also have watery vaginal discharge for around 1 to 2 weeks. It may take a few days to 2 weeks to recover. This care sheet gives you a general idea about how long it will take for you to recover. But each person recovers at a different pace. Follow the steps below to feel better as quickly as possible. How can you care for yourself at home? Activity    · Rest when you feel tired. Getting enough sleep will help you recover.     · You probably can return to work on the day after the procedure.     · You may shower and take baths as usual.     · Ask your doctor when it is okay for you to have sex or use tampons. Do not douche. Diet    · You can eat your normal diet. If your stomach is upset, try bland, low-fat foods like plain rice, broiled chicken, toast, and yogurt.     · You may notice that your bowel movements are not regular right after the procedure. This is common. Try to avoid constipation and straining with bowel movements. You may want to take a fiber supplement every day. If you have not had a bowel movement after a couple of days, ask your doctor about taking a mild laxative. Medicines    · Your doctor will tell you if and when you can restart your medicines. You will also get instructions about taking any new medicines.     · If you take aspirin or some other blood thinner, ask your doctor if and when to start taking it again.  Make sure that you understand exactly what your doctor wants you to do.     · Take pain medicines exactly as directed. ? If the doctor gave you a prescription medicine for pain, take it as prescribed. ? If you are not taking a prescription pain medicine, ask your doctor if you can take an over-the-counter medicine.     · If you think your pain medicine is making you sick to your stomach:  ? Take your medicine after meals (unless your doctor has told you not to). ? Ask your doctor for a different pain medicine.     · If your doctor prescribed antibiotics, take them as directed. Do not stop taking them just because you feel better. You need to take the full course of antibiotics. Other instructions    · You may have some light vaginal bleeding. Wear sanitary pads if needed.     · You may want to use a heating pad on your belly to help with pain. Use a low heat setting.     · Talk with your doctor about birth control. Endometrial ablation usually causes infertility, but pregnancy may still be possible. And the pregnancy could have severe problems. Follow-up care is a key part of your treatment and safety. Be sure to make and go to all appointments, and call your doctor if you are having problems. It's also a good idea to know your test results and keep a list of the medicines you take. When should you call for help? Call 911 anytime you think you may need emergency care. For example, call if:    · You passed out (lost consciousness).     · You have chest pain, are short of breath, or cough up blood. Call your doctor now or seek immediate medical care if:    · You have pain that does not get better after you take pain medicine.     · You cannot pass stools or gas.     · You have vaginal discharge that has increased in amount or smells bad.     · You are sick to your stomach or cannot drink fluids.     · You have signs of infection, such as:  ? Increased pain, swelling, warmth, or redness. ? A fever.     · You have severe vaginal bleeding.  This means that you are soaking through your usual pads or tampons every hour for 2 or more hours.     · You have signs of a blood clot in your leg (called a deep vein thrombosis), such as:  ? Pain in your calf, back of the knee, thigh, or groin. ? Redness and swelling in your leg. Watch closely for any changes in your health, and be sure to contact your doctor if you have any problems. Where can you learn more? Go to http://www.gray.com/  Enter C609 in the search box to learn more about \"Endometrial Ablation: What to Expect at Home. \"  Current as of: November 22, 2021               Content Version: 13.2  © 2006-2022 [employer]. Care instructions adapted under license by [employer] (which disclaims liability or warranty for this information). If you have questions about a medical condition or this instruction, always ask your healthcare professional. [address] any warranty or liability for your use of this information. Dilation and Curettage: What to Expect at Home  Your Recovery  Dilation and curettage (D&C) is a procedure to remove tissue from the inside of the uterus. The doctor used a curved tool, called a curette, to gently scrape tissue from your uterus. You are likely to have a backache, or cramps similar to menstrual cramps, and pass small clots of blood from your vagina for the first few days. You may have light vaginal bleeding for several weeks after the procedure. You will probably be able to go back to most of your normal activities in 1 or 2 days. This care sheet gives you a general idea about how long it will take for you to recover. But each person recovers at a different pace. Follow the steps below to get better as quickly as possible. How can you care for yourself at home? Activity    · Rest when you feel tired.  Getting enough sleep will help you recover.     · Most women are able to return to work the day after the procedure.     · You may have some light vaginal bleeding. Use sanitary pads until you stop bleeding. Using pads makes it easier to monitor your bleeding. Do not rinse your vagina with fluid (douche).   · Ask your doctor when it is okay for you to have sex. Diet    · You can eat your normal diet. If your stomach is upset, try bland, low-fat foods like plain rice, broiled chicken, toast, and yogurt.     · Drink plenty of fluids (unless your doctor tells you not to). Medicines    · Your doctor will tell you if and when you can restart your medicines. You will also get instructions about taking any new medicines.     · If you take aspirin or some other blood thinner, ask your doctor if and when to start taking it again. Make sure that you understand exactly what your doctor wants you to do.     · Be safe with medicines. Take pain medicines exactly as directed. ? If the doctor gave you a prescription medicine for pain, take it as prescribed. ? If you are not taking a prescription pain medicine, ask your doctor if you can take an over-the-counter medicine.     · If you think your pain medicine is making you sick to your stomach:  ? Take your medicine after meals (unless your doctor has told you not to). ? Ask your doctor for a different pain medicine.     · If your doctor prescribed antibiotics, take them as directed. Do not stop taking them just because you feel better. You need to take the full course of antibiotics. Follow-up care is a key part of your treatment and safety. Be sure to make and go to all appointments, and call your doctor if you are having problems. It's also a good idea to know your test results and keep a list of the medicines you take. When should you call for help? Call 911 anytime you think you may need emergency care. For example, call if:    · You passed out (lost consciousness).     · You have chest pain, are short of breath, or cough up blood.    Call your doctor now or seek immediate medical care if:    · You have bright red vaginal bleeding that soaks one or more pads in an hour, or you have large clots.     · You have vaginal discharge that increases in amount or smells bad.     · You are sick to your stomach or cannot drink fluids.     · You have pain that does not get better after you take pain medicine.     · You cannot pass stools or gas.     · You have symptoms of a blood clot in your leg (called a deep vein thrombosis), such as:  ? Pain in your calf, back of the knee, thigh, or groin. ? Redness and swelling in your leg.     · You have signs of infection, such as:  ? Increased pain, swelling, warmth, or redness. ? Red streaks leading from the area. ? Pus draining from the area. ? A fever. Watch closely for changes in your health, and be sure to contact your doctor if you have any problems. Where can you learn more? Go to http://www.gray.com/  Enter D453 in the search box to learn more about \"Dilation and Curettage: What to Expect at Home. \"  Current as of: June 16, 2021               Content Version: 13.2  © 3820-7782 [employer]. Care instructions adapted under license by [employer] (which disclaims liability or warranty for this information). If you have questions about a medical condition or this instruction, always ask your healthcare professional. [place] [address] any warranty or liability for your use of this information. Learning About General Anesthesia  What is general anesthesia? Anesthesia helps control pain during surgery or other procedures. General anesthesia affects your whole body. It uses medicines that make you unconscious. It also causes you to forget things for a short time. And it relaxes your muscles. When it's used, you should be completely unaware. You won't feel pain during the surgery or procedure. General anesthesia reduces many of your body's normal functions, such as those that control breathing.  So the anesthesia specialist will watch you closely during the surgery. How is it given? Anesthesia medicine may be given through a needle in a vein (intravenous, or IV.) Or it may be inhaled. Sometimes it's given both ways. During the procedure, an anesthesia specialist closely watches your vital signs and keeps them stable by adjusting the medicines as needed. Vital signs include your heartbeat, breathing, and blood pressure. What are the risks? Major side effects are not common. But all types of anesthesia have some risk. Your risk depends on your overall health. It also depends on the type of anesthesia you have and how you respond to it. Serious but rare risks include breathing problems, heart problems, and stroke. Malignant hyperthermia is a serious reaction that can occur very rarely with some anesthesia medicines. It can be deadly. The chance of having this reaction may be passed down in families. Some health conditions increase the risk of problems. Your anesthesia specialist will ask you about any health problems you have. You will discuss things that can increase your risk. These include sleep apnea, obesity, and heart and lung disease. What can you do to prepare? You will get a list of instructions to help you prepare. Your anesthesia specialist will let you know what to expect when you get to the hospital, during the surgery, and after. You'll be told when to stop eating and drinking. If you take medicine, you'll be told what you can and can't take before surgery. Your anesthesia specialist will talk with you about the risks and benefits of anesthesia. If you have questions, be sure to ask. Many people are nervous before they have anesthesia and surgery. Ask your doctor about ways to relax ahead of time. Relaxation exercises may be one option. What can you expect after having general anesthesia? Right after the surgery, you will be in the recovery room. Nurses will make sure you are comfortable. As the anesthesia wears off, you may feel some pain and discomfort from your surgery. Tell someone on your care team if you have pain. Pain medicine works better if you take it before the pain gets bad. When you first wake up from the anesthesia, you may be confused. It may be hard to think clearly. This is normal. It takes time for the effects of the medicine to completely wear off. Wait 24 hours before you drive or operate heavy machinery, make important decisions, go to work or school, or sign legal documents. Other common side effects of anesthesia include:  · Nausea and vomiting. This usually won't last long. You can take medicine to treat it. · A slight drop in body temperature. You may feel cold and shiver when you first wake up. · A sore throat. · Muscle aches or weakness. · Feeling tired. For minor surgeries, you may go home the same day. For other surgeries, you may stay in the hospital. Your anesthesia specialist will check on you as you recover from the anesthesia. They can answer any questions you may have. Follow-up care is a key part of your treatment and safety. Be sure to make and go to all appointments, and call your doctor if you are having problems. It's also a good idea to know your test results and keep a list of the medicines you take. Where can you learn more? Go to http://www.gray.com/  Enter V817 in the search box to learn more about \"Learning About General Anesthesia. \"  Current as of: June 23, 2021               Content Version: 13.2  © 2006-2022 [employer], [facility]. Care instructions adapted under license by [employer] (which disclaims liability or warranty for this information). If you have questions about a medical condition or this instruction, always ask your healthcare professional. [place] [address] any warranty or liability for your use of this information.

## 2022-05-12 NOTE — ANESTHESIA PREPROCEDURE EVALUATION
Relevant Problems   No relevant active problems       Anesthetic History     PONV          Review of Systems / Medical History  Patient summary reviewed, nursing notes reviewed and pertinent labs reviewed    Pulmonary  Within defined limits                 Neuro/Psych   Within defined limits           Cardiovascular  Within defined limits                     GI/Hepatic/Renal  Within defined limits              Endo/Other  Within defined limits           Other Findings            Past Medical History:   Diagnosis Date    Anemia     Heavy periods 2021    Nausea & vomiting        Past Surgical History:   Procedure Laterality Date    HX ORTHOPAEDIC      Right Knee Surgery       Current Outpatient Medications   Medication Instructions    amphetamine-dextroamphetamine XR (ADDERALL XR) 20 mg XR capsule TAKE 1 CAPSULE BY MOUTH IN THE MORNING    norgestimate-ethinyl estradioL (Tri-Sprintec, 28,) 0.18/0.215/0.25 mg-35 mcg (28) tab 1 Tablet, Oral, DAILY    OTHER Oral, DAILY, Prenatal vitamin        Current Facility-Administered Medications   Medication Dose Route Frequency    lactated Ringers infusion  20 mL/hr IntraVENous CONTINUOUS       Patient Vitals for the past 24 hrs:   Temp Pulse Resp BP SpO2   05/12/22 0628 36.8 °C (98.3 °F) 98 18 130/84 100 %       Lab Results   Component Value Date/Time    WBC 7.7 05/05/2022 02:00 PM    HGB 10.5 (L) 05/05/2022 02:00 PM    HCT 35.4 05/05/2022 02:00 PM    PLATELET 723 40/33/7033 02:00 PM    MCV 70.9 (L) 05/05/2022 02:00 PM     Lab Results   Component Value Date/Time    Sodium 138 05/05/2022 02:00 PM    Potassium 3.7 05/05/2022 02:00 PM    Chloride 107 05/05/2022 02:00 PM    CO2 25 05/05/2022 02:00 PM    Anion gap 6 05/05/2022 02:00 PM    Glucose 91 05/05/2022 02:00 PM    BUN 9 05/05/2022 02:00 PM    Creatinine 0.92 05/05/2022 02:00 PM    BUN/Creatinine ratio 10 (L) 05/05/2022 02:00 PM    GFR est AA >60 05/05/2022 02:00 PM    GFR est non-AA >60 05/05/2022 02:00 PM    Calcium 9.2 05/05/2022 02:00 PM     No results found for: APTT, PTP, INR, INREXT  Lab Results   Component Value Date/Time    Glucose 91 05/05/2022 02:00 PM     Physical Exam    Airway  Mallampati: I  TM Distance: 4 - 6 cm  Neck ROM: normal range of motion   Mouth opening: Normal     Cardiovascular    Rhythm: regular  Rate: normal         Dental  No notable dental hx       Pulmonary  Breath sounds clear to auscultation               Abdominal  GI exam deferred       Other Findings            Anesthetic Plan    ASA: 2  Anesthesia type: general          Induction: Intravenous  Anesthetic plan and risks discussed with: Patient and Family

## 2022-05-12 NOTE — ROUTINE PROCESS
TRANSFER - OUT REPORT:    Verbal/bedside  report given to [facility] RN on Vickie Herring  being transferred to [facility] bed 22 for     Report consisted of patients Situation, Background, Assessment and   Recommendations(SBAR). Information from the following report(s)  was reviewed with the receiving nurse. Opportunity for questions and clarification was provided.

## 2022-05-12 NOTE — OP NOTES
[facility]  OPERATIVE REPORT    Name:  Kale Tomlinson  MR#:  [id number]  :  1980  ACCOUNT #:  [de-identified]  DATE OF SERVICE:  2022    PREOPERATIVE DIAGNOSES:  1. Menorrhagia. 2.  Anemia. 3.  Fibroid uterus. POSTOPERATIVE DIAGNOSES:  1. Menorrhagia. 2.  Anemia. 3.  Fibroid uterus. PROCEDURE PERFORMED:  Diagnostic hysteroscopy. SURGEON:  Twan Bridges MD    ASSISTANT:  Sarah Peterson. ANESTHESIA:  LMA. COMPLICATIONS:  None. SPECIMENS REMOVED:  Uterine tissue. IMPLANTS:  None. ESTIMATED BLOOD LOSS:  Minimal.    DRAINS:  Straight catheter, clear urine. SURGICAL FINDINGS:  Multiple flat broad-based submucosal fibroids. Normal os bilaterally. Cavity length 5.5, cavity width 4.4. NovaSure ablation attempted but would not pass cavity assessment multiple times. No signs of perforation. D and C not performed due to normal endometrial lining besides the above-mentioned fibroids. PROCEDURE:  The patient was taken to the operating room after informed consent had been reviewed with the patient and her . She was placed on the operating room table in the supine position and administered general anesthesia. Her legs were then placed in Yellofins stirrups, and she was prepped and draped in normal sterile fashion. Time-out was performed by me. Her bladder was drained using a straight catheter. After this was done, a speculum was placed inside the vagina. Anterior lip of the cervix was grasped with a single-tooth tenaculum. The endocervical canal was dilated using a Hegar dilator. It was found to be adequately dilated already. The Aveta hysteroscope was then primed, advanced into the uterine cavity, and the above findings were noted. Using the resection device, an attempt to see if some of these fibroids could be shaved back was done without success just due to them being flat and broad-based and not protruding into the cavity significantly. There was no indication to do a D and C because the lining was noted to be thin, so the Aveta device was removed. The NovaSure device was set to a cavity length of 5.5. It was advanced through the endocervical canal, brought back off the fundus of the uterus and deployed. The width was found to be 4.4. These coordinates were set into the machine. Cavity assessment was attempted which failed. This was done for a second time after sealing the cervical collar, failed once again, the device was removed. The Aveta hysteroscope was advanced back into the cavity to make sure there were no signs of perforation. During this time, the pressures and fluid volumes were normal and stable. There were no signs of perforation. Third attempt of the device was performed. After this was done, it failed the cavity assessment, therefore the procedure was aborted due to the fact that a seal could not be achieved due to the multiple broad-based fibroids distorting the cavity. All instruments were removed from the uterus, cervix, and vagina. Sponge, lap, and needle counts were correct x2. The patient tolerated the procedure without complications and was taken to the recovery room in stable condition.       Yimi Lizama MD      [id number]  D:  05/12/2022 9:01  T:  05/12/2022 17:56  JOB #:  [id number]

## 2022-05-12 NOTE — ANESTHESIA POSTPROCEDURE EVALUATION
Procedure(s): HYSTEROSCOPY.     general    Anesthesia Post Evaluation        Patient location during evaluation: PACU  Patient participation: complete - patient participated  Level of consciousness: awake  Pain score: 0  Pain management: adequate  Airway patency: patent  Anesthetic complications: no  Cardiovascular status: acceptable  Respiratory status: acceptable  Hydration status: acceptable  Post anesthesia nausea and vomiting:  controlled  Final Post Anesthesia Temperature Assessment:  Normothermia (36.0-37.5 degrees C)      INITIAL Post-op Vital signs:   Vitals Value Taken Time   /77 05/12/22 0857   Temp 36.6 °C (97.9 °F) 05/12/22 0850   Pulse 95 05/12/22 0857   Resp 15 05/12/22 0857   SpO2 100 % 05/12/22 0857

## 2022-05-16 ENCOUNTER — DOCUMENTATION ONLY (OUTPATIENT)
Dept: OBGYN CLINIC | Age: 42
End: 2022-05-16

## 2022-05-16 NOTE — PROGRESS NOTES
Called and checked on the pt- discussed surgical findings  Options given- Depo-lupron and laxmi in the future or UFE  Will get evaluated for the later  Referral sent to [facility]

## 2022-06-15 ENCOUNTER — OFFICE VISIT (OUTPATIENT)
Dept: OBGYN CLINIC | Age: 42
End: 2022-06-15
Payer: COMMERCIAL

## 2022-06-15 VITALS — HEIGHT: 63 IN | WEIGHT: 192 LBS | BODY MASS INDEX: 34.02 KG/M2

## 2022-06-15 DIAGNOSIS — N92.1 MENORRHAGIA WITH IRREGULAR CYCLE: ICD-10-CM

## 2022-06-15 DIAGNOSIS — Z09 POSTOP CHECK: Primary | ICD-10-CM

## 2022-06-15 PROCEDURE — 99024 POSTOP FOLLOW-UP VISIT: CPT | Performed by: OBSTETRICS & GYNECOLOGY

## 2022-06-15 RX ORDER — ACETAMINOPHEN AND CODEINE PHOSPHATE 120; 12 MG/5ML; MG/5ML
1 SOLUTION ORAL DAILY
Qty: 3 DOSE PACK | Refills: 1 | Status: SHIPPED | OUTPATIENT
Start: 2022-06-15

## 2022-06-15 NOTE — PROGRESS NOTES
Chief Complaint   Patient presents with    Post OP Follow Up     s/p Hysteroscopy/d&c/ablation 5-12-22     Visit Vitals  Ht 5' 3\" (1.6 m)   Wt 192 lb (87.1 kg)   BMI 34.01 kg/m²

## 2022-12-13 ENCOUNTER — OFFICE VISIT (OUTPATIENT)
Dept: OBGYN CLINIC | Age: 42
End: 2022-12-13
Payer: COMMERCIAL

## 2022-12-13 VITALS
WEIGHT: 196.13 LBS | SYSTOLIC BLOOD PRESSURE: 110 MMHG | BODY MASS INDEX: 34.75 KG/M2 | HEIGHT: 63 IN | DIASTOLIC BLOOD PRESSURE: 70 MMHG

## 2022-12-13 DIAGNOSIS — Z12.31 SCREENING MAMMOGRAM FOR BREAST CANCER: ICD-10-CM

## 2022-12-13 DIAGNOSIS — D21.9 FIBROIDS: ICD-10-CM

## 2022-12-13 DIAGNOSIS — D50.0 IRON DEFICIENCY ANEMIA DUE TO CHRONIC BLOOD LOSS: ICD-10-CM

## 2022-12-13 DIAGNOSIS — Z12.4 SCREENING FOR MALIGNANT NEOPLASM OF CERVIX: Primary | ICD-10-CM

## 2022-12-13 DIAGNOSIS — Z01.419 ROUTINE GYNECOLOGICAL EXAMINATION: ICD-10-CM

## 2022-12-13 PROCEDURE — 99396 PREV VISIT EST AGE 40-64: CPT | Performed by: OBSTETRICS & GYNECOLOGY

## 2022-12-13 RX ORDER — METOPROLOL SUCCINATE 25 MG/1
25 TABLET, EXTENDED RELEASE ORAL
COMMUNITY
Start: 2022-11-08

## 2022-12-13 RX ORDER — ACETAMINOPHEN AND CODEINE PHOSPHATE 120; 12 MG/5ML; MG/5ML
1 SOLUTION ORAL DAILY
Qty: 3 DOSE PACK | Refills: 3 | Status: SHIPPED | OUTPATIENT
Start: 2022-12-13

## 2022-12-13 NOTE — PROGRESS NOTES
Gallo Hernández is a , 36 y.o. female   Patient's last menstrual period was 2022 (approximate). She presents for her annual    She is having  pelvic fullness and pressure at times . Menstrual status:  Cycles are very light to nonexistent due to contraceptive hormones. Flow: spotting. She does not have dysmenorrhea. Medical conditions:  Since her last annual GYN exam about one year ago, she has not the following changes in her health history: none. Mammogram History:    [facility] Results (most recent):  Results from Appointment encounter on 05/10/21    [facility] MAMMO BI SCREENING INCL CAD    Narrative  History: Baseline, asymptomatic. The breasts are heterogeneously dense which could obscure mammographic findings. In the CC view of the left breast, there are 2 focal asymmetries, one located  slightly lateral to the nipple line, the other medially, each in the mid to  posterior coronal third of the breast. No other suspicious finding or pathologic  calcification is seen in either breast.    CAD was used in interpretation. Impression  Spot compression views and possibly ultrasound are needed to  evaluate focal asymmetries in left breast.    BI-RADS 0 need additional imaging evaluation. Follow-up: Additional views and possibly ultrasound. DEXA Results (most recent):  No results found for this or any previous visit. Past Medical History:   Diagnosis Date    Anemia     Heavy periods     Nausea & vomiting      Past Surgical History:   Procedure Laterality Date    HX GYN  2022    hysteroscopy/d&c/ablation    HX ORTHOPAEDIC      Right Knee Surgery       Prior to Admission medications    Medication Sig Start Date End Date Taking? Authorizing Provider   metoprolol succinate (TOPROL-XL) 25 mg XL tablet Take 25 mg by mouth nightly. 22  Yes Provider, Historical   norethindrone (MICRONOR) 0.35 mg tab Take 1 Tablet by mouth daily.  22  Yes Ana María Ulloa MD OTHER Take  by mouth daily. Prenatal vitamin   Yes Provider, Historical   amphetamine-dextroamphetamine XR (ADDERALL XR) 20 mg XR capsule TAKE 1 CAPSULE BY MOUTH IN THE MORNING 8/4/20  Yes Provider, Historical       No Known Allergies       Tobacco History:  reports that she has never smoked. She has never used smokeless tobacco.  Alcohol use:  reports current alcohol use. Drug use:  reports that she does not currently use drugs. Family Medical/Cancer History:   Family History   Problem Relation Age of Onset    No Known Problems Mother     Heart Disease Father     Cancer Father         lung-smoker          Review of Systems   Constitutional:  Negative for chills, fever, malaise/fatigue and weight loss. HENT:  Negative for congestion, ear pain, sinus pain and tinnitus. Eyes:  Negative for blurred vision and double vision. Respiratory:  Negative for cough, shortness of breath and wheezing. Cardiovascular:  Negative for chest pain and palpitations. Gastrointestinal:  Negative for abdominal pain, blood in stool, constipation, diarrhea, heartburn, nausea and vomiting. Genitourinary:  Negative for dysuria, flank pain, frequency, hematuria and urgency. Musculoskeletal:  Negative for joint pain and myalgias. Skin:  Negative for itching and rash. Neurological:  Negative for dizziness, weakness and headaches. Psychiatric/Behavioral:  Negative for depression, memory loss and suicidal ideas. The patient is not nervous/anxious and does not have insomnia. Physical Exam  Constitutional:       Appearance: Normal appearance. HENT:      Head: Normocephalic and atraumatic. Cardiovascular:      Rate and Rhythm: Normal rate. Heart sounds: Normal heart sounds. Pulmonary:      Effort: Pulmonary effort is normal.      Breath sounds: Normal breath sounds. Chest:   Breasts:     Right: Normal.      Left: Normal.   Abdominal:      General: Abdomen is flat. Palpations: Abdomen is soft. Genitourinary:     General: Normal vulva. Vagina: Normal.      Cervix: Normal.      Uterus: Normal. Enlarged. Adnexa: Right adnexa normal and left adnexa normal.      Rectum: Normal.      Comments: PAP Obtained  Neurological:      Mental Status: She is alert. Psychiatric:         Mood and Affect: Mood normal.         Behavior: Behavior normal.         Thought Content: Thought content normal.        Visit Vitals  /70 (BP 1 Location: Left upper arm, BP Patient Position: Sitting, BP Cuff Size: Large adult)   Ht 5' 3\" (1.6 m)   Wt 196 lb 2 oz (89 kg)   LMP 11/30/2022 (Approximate)   BMI 34.74 kg/m²         Assessment: Diagnoses and all orders for this visit:    1. Screening for malignant neoplasm of cervix  -     PAP IG, RFX APTIMA HPV ASCUS ([id number])    2. Routine gynecological examination  -     PAP IG, RFX APTIMA HPV ASCUS ([id number])    3. Screening mammogram for breast cancer  -     [facility] 3D LASHA W MAMMO BI SCREENING INCL CAD; Future    4. Fibroids    5. Iron deficiency anemia due to chronic blood loss    Other orders  -     norethindrone (MICRONOR) 0.35 mg tab; Take 1 Tablet by mouth daily. DWP options- Refer for UFE    Plan: Questions addressed  Counseled re: diet, exercise, healthy lifestyle  Return for Annual  Rec annual mammogram      Follow-up and Dispositions    Return for 1 yr annual, 1 yr mammo.

## 2022-12-13 NOTE — PROGRESS NOTES
Chief Complaint   Patient presents with    Annual Exam     Visit Vitals  /70 (BP 1 Location: Left upper arm, BP Patient Position: Sitting, BP Cuff Size: Large adult)   Ht 5' 3\" (1.6 m)   Wt 196 lb 2 oz (89 kg)   LMP 11/30/2022 (Approximate)   BMI 34.74 kg/m²

## 2022-12-20 DIAGNOSIS — D25.9 UTERINE LEIOMYOMA, UNSPECIFIED LOCATION: Primary | ICD-10-CM

## 2023-11-29 ENCOUNTER — TELEPHONE (OUTPATIENT)
Age: 43
End: 2023-11-29

## 2023-11-29 DIAGNOSIS — N94.6 DYSMENORRHEA: Primary | ICD-10-CM

## 2023-11-29 RX ORDER — ACETAMINOPHEN AND CODEINE PHOSPHATE 120; 12 MG/5ML; MG/5ML
0.35 SOLUTION ORAL DAILY
Qty: 30 TABLET | Refills: 2 | Status: SHIPPED | OUTPATIENT
Start: 2023-11-29

## 2024-04-19 DIAGNOSIS — N94.6 DYSMENORRHEA: ICD-10-CM

## 2024-04-19 RX ORDER — ACETAMINOPHEN AND CODEINE PHOSPHATE 120; 12 MG/5ML; MG/5ML
0.35 SOLUTION ORAL DAILY
Qty: 28 TABLET | Refills: 0 | Status: SHIPPED | OUTPATIENT
Start: 2024-04-19

## 2024-04-22 ENCOUNTER — OFFICE VISIT (OUTPATIENT)
Age: 44
End: 2024-04-22
Payer: COMMERCIAL

## 2024-04-22 VITALS
WEIGHT: 205.38 LBS | DIASTOLIC BLOOD PRESSURE: 68 MMHG | HEIGHT: 63 IN | BODY MASS INDEX: 36.39 KG/M2 | SYSTOLIC BLOOD PRESSURE: 114 MMHG

## 2024-04-22 DIAGNOSIS — Z12.4 PAP SMEAR FOR CERVICAL CANCER SCREENING: ICD-10-CM

## 2024-04-22 DIAGNOSIS — N94.6 DYSMENORRHEA: ICD-10-CM

## 2024-04-22 DIAGNOSIS — Z01.419 GYNECOLOGIC EXAM NORMAL: ICD-10-CM

## 2024-04-22 DIAGNOSIS — Z12.31 SCREENING MAMMOGRAM FOR BREAST CANCER: Primary | ICD-10-CM

## 2024-04-22 PROCEDURE — 99396 PREV VISIT EST AGE 40-64: CPT | Performed by: OBSTETRICS & GYNECOLOGY

## 2024-04-22 RX ORDER — ACETAMINOPHEN AND CODEINE PHOSPHATE 120; 12 MG/5ML; MG/5ML
0.35 SOLUTION ORAL DAILY
Qty: 84 TABLET | Refills: 3 | Status: SHIPPED | OUTPATIENT
Start: 2024-04-22

## 2024-04-22 ASSESSMENT — ENCOUNTER SYMPTOMS
GASTROINTESTINAL NEGATIVE: 1
RESPIRATORY NEGATIVE: 1

## 2024-04-22 NOTE — PROGRESS NOTES
Chief Complaint   Patient presents with    Annual Exam     Pedef-0-59-21     /68 (Site: Left Upper Arm, Position: Sitting, Cuff Size: Large Adult)   Ht 1.6 m (5' 3\")   Wt 93.2 kg (205 lb 6 oz)   LMP 04/22/2024 (Exact Date)   BMI 36.38 kg/m²

## 2024-04-22 NOTE — PROGRESS NOTES
Kenia Drake is a , 43 y.o. female   Patient's last menstrual period was 2024 (exact date).    She presents for her annual    She is having no significant problems.      Menstrual status:  Cycles are very light to non existent due to contraceptive hormones.    Flow: light.      She does not have dysmenorrhea.      Medical conditions:  Since her last annual GYN exam about one year ago, she has not the following changes in her health history: none.     Mammogram History:    [facility] ([facility] recent[facility]  [id number]     DEXA Results (most recent):  @[facility]([id number])@       Past Medical History:   Diagnosis Date    Anemia     Heavy periods     Nausea & vomiting      Past Surgical History:   Procedure Laterality Date    GYN  2022    hysteroscopy/d&c/ablation    ORTHOPEDIC SURGERY      Right Knee Surgery       Prior to Admission medications    Medication Sig Start Date End Date Taking? Authorizing Provider   norethindrone (MICRONOR) 0.35 MG tablet Take 1 tablet by mouth daily 24  Yes Ortiz Simons MD   amphetamine-dextroamphetamine (ADDERALL XR) 20 MG extended release capsule TAKE 1 CAPSULE BY MOUTH IN THE MORNING 20  Yes Automatic Reconciliation, Ar   metoprolol succinate (TOPROL XL) 25 MG extended release tablet Take 1 tablet by mouth nightly 22  Yes Automatic Reconciliation, Ar       No Known Allergies       Tobacco History:  reports that she has never smoked. She has never used smokeless tobacco.  Alcohol use:  reports current alcohol use.  Drug use:  reports that she does not currently use drugs.    Family Medical/Cancer History:   Family History   Problem Relation Age of Onset    Cancer Father         lung-smoker    Heart Disease Father     No Known Problems Mother         Review of Systems   Constitutional: Negative.    Respiratory: Negative.     Cardiovascular: Negative.    Gastrointestinal: Negative.    Genitourinary: Negative.

## 2024-05-06 ENCOUNTER — HOSPITAL ENCOUNTER (OUTPATIENT)
Facility: HOSPITAL | Age: 44
Discharge: HOME | End: 2024-05-09
Payer: COMMERCIAL

## 2024-05-06 VITALS — HEIGHT: 63 IN | WEIGHT: 205 LBS | BODY MASS INDEX: 36.32 KG/M2

## 2024-05-06 DIAGNOSIS — Z12.31 SCREENING MAMMOGRAM FOR BREAST CANCER: ICD-10-CM

## 2024-05-06 LAB
., LABCORP: ABNORMAL
CYTOLOGIST CVX/VAG CYTO: ABNORMAL
CYTOLOGY CVX/VAG DOC CYTO: ABNORMAL
CYTOLOGY CVX/VAG DOC THIN PREP: ABNORMAL
DX ICD CODE: ABNORMAL
DX ICD CODE: ABNORMAL
HPV I/H RISK 4 DNA CVX QL PROBE+SIG AMP: NEGATIVE
Lab: ABNORMAL
OTHER STN SPEC: ABNORMAL
PATHOLOGIST CVX/VAG CYTO: ABNORMAL
STAT OF ADQ CVX/VAG CYTO-IMP: ABNORMAL

## 2024-05-06 PROCEDURE — 77063 BREAST TOMOSYNTHESIS BI: CPT

## 2025-04-08 DIAGNOSIS — N94.6 DYSMENORRHEA: ICD-10-CM

## 2025-04-08 RX ORDER — ACETAMINOPHEN AND CODEINE PHOSPHATE 120; 12 MG/5ML; MG/5ML
0.35 SOLUTION ORAL DAILY
Qty: 84 TABLET | Refills: 0 | Status: SHIPPED | OUTPATIENT
Start: 2025-04-08

## 2025-07-06 DIAGNOSIS — N94.6 DYSMENORRHEA: ICD-10-CM

## 2025-07-08 RX ORDER — ACETAMINOPHEN AND CODEINE PHOSPHATE 120; 12 MG/5ML; MG/5ML
1 SOLUTION ORAL DAILY
Qty: 84 TABLET | Refills: 0 | Status: SHIPPED | OUTPATIENT
Start: 2025-07-08

## 2025-07-10 ENCOUNTER — TELEPHONE (OUTPATIENT)
Age: 45
End: 2025-07-10

## 2025-07-10 NOTE — TELEPHONE ENCOUNTER
Spoke with patient who contacted the office requesting refill on medication. Advised that prescription was sent 2 days ago and should be able to  from pharmacy.

## 2025-08-06 ENCOUNTER — OFFICE VISIT (OUTPATIENT)
Age: 45
End: 2025-08-06
Payer: COMMERCIAL

## 2025-08-06 VITALS
HEIGHT: 63 IN | WEIGHT: 152 LBS | BODY MASS INDEX: 26.93 KG/M2 | SYSTOLIC BLOOD PRESSURE: 110 MMHG | DIASTOLIC BLOOD PRESSURE: 72 MMHG

## 2025-08-06 DIAGNOSIS — N94.89 SUPPRESSION OF MENSTRUATION: ICD-10-CM

## 2025-08-06 DIAGNOSIS — Z12.31 SCREENING MAMMOGRAM FOR BREAST CANCER: Primary | ICD-10-CM

## 2025-08-06 DIAGNOSIS — Z12.4 PAP SMEAR FOR CERVICAL CANCER SCREENING: ICD-10-CM

## 2025-08-06 DIAGNOSIS — Z01.419 GYNECOLOGIC EXAM NORMAL: ICD-10-CM

## 2025-08-06 DIAGNOSIS — N94.6 DYSMENORRHEA: ICD-10-CM

## 2025-08-06 DIAGNOSIS — D21.9 FIBROIDS: ICD-10-CM

## 2025-08-06 PROCEDURE — 99396 PREV VISIT EST AGE 40-64: CPT | Performed by: OBSTETRICS & GYNECOLOGY

## 2025-08-06 RX ORDER — LISDEXAMFETAMINE DIMESYLATE 40 MG/1
CAPSULE ORAL
COMMUNITY

## 2025-08-06 RX ORDER — ACETAMINOPHEN AND CODEINE PHOSPHATE 120; 12 MG/5ML; MG/5ML
1 SOLUTION ORAL DAILY
Qty: 84 TABLET | Refills: 3 | Status: SHIPPED | OUTPATIENT
Start: 2025-08-06

## 2025-08-06 ASSESSMENT — ENCOUNTER SYMPTOMS
RESPIRATORY NEGATIVE: 1
GASTROINTESTINAL NEGATIVE: 1

## 2025-08-09 LAB
., LABCORP: NORMAL
CYTOLOGIST CVX/VAG CYTO: NORMAL
CYTOLOGY CVX/VAG DOC CYTO: NORMAL
CYTOLOGY CVX/VAG DOC THIN PREP: NORMAL
DX ICD CODE: NORMAL
OTHER STN SPEC: NORMAL
SERVICE CMNT-IMP: NORMAL
STAT OF ADQ CVX/VAG CYTO-IMP: NORMAL

## (undated) DEVICE — TOWEL SURG W17XL27IN STD BLU COT NONFENESTRATED PREWASHED

## (undated) DEVICE — SOLUTION IRRIG 3000ML 0.9% SOD CHL USP UROMATIC PLAS CONT

## (undated) DEVICE — MINOR VAGINAL PACK: Brand: MEDLINE INDUSTRIES, INC.

## (undated) DEVICE — Device

## (undated) DEVICE — DRAPE,REIN 53X77,STERILE: Brand: MEDLINE

## (undated) DEVICE — CATHETER,URETHRAL,REDRUBBER,STRL,14FR: Brand: MEDLINE INDUSTRIES, INC.

## (undated) DEVICE — SOLUTION IRRIG 500ML 0.9% SOD CHL USP POUR PLAS BTL

## (undated) DEVICE — BASIC SINGLE BASIN-LF: Brand: MEDLINE INDUSTRIES, INC.

## (undated) DEVICE — SYSTEM WST MGMT AVETA

## (undated) DEVICE — KIT THERMOABLATION 6MM ENDOMET DEV NOVASURE

## (undated) DEVICE — GLOVE ORTHO 7 1/2   MSG9475

## (undated) DEVICE — SYSTEM WST MGMT W/ CAP AVETA

## (undated) DEVICE — DRAPE,UNDERBUTTOCKS,PCH,STERILE: Brand: MEDLINE

## (undated) DEVICE — SOUTHSIDE TURNOVER: Brand: MEDLINE INDUSTRIES, INC.